# Patient Record
Sex: MALE | Race: AMERICAN INDIAN OR ALASKA NATIVE
[De-identification: names, ages, dates, MRNs, and addresses within clinical notes are randomized per-mention and may not be internally consistent; named-entity substitution may affect disease eponyms.]

---

## 2018-08-11 ENCOUNTER — HOSPITAL ENCOUNTER (EMERGENCY)
Dept: HOSPITAL 5 - ED | Age: 33
Discharge: HOME | End: 2018-08-11
Payer: SELF-PAY

## 2018-08-11 VITALS — DIASTOLIC BLOOD PRESSURE: 95 MMHG | SYSTOLIC BLOOD PRESSURE: 147 MMHG
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
## 2018-08-11 DIAGNOSIS — S91.332A: Primary | ICD-10-CM
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
## 2018-08-11 PROCEDURE — 99282 EMERGENCY DEPT VISIT SF MDM: CPT

## 2018-08-11 PROCEDURE — 90715 TDAP VACCINE 7 YRS/> IM: CPT

## 2018-08-11 PROCEDURE — 90471 IMMUNIZATION ADMIN: CPT

## 2018-08-11 NOTE — EMERGENCY DEPARTMENT REPORT
- General


Chief Complaint: Puncture Wound


Stated Complaint: STEPPED ON NAIL/RT FOOT


Time Seen by Provider: 18 19:47


Source: patient


Mode of arrival: Ambulatory


Limitations: No Limitations





- History of Present Illness


Initial Comments: 


Patient 33-year-old -American male presents with status post stepping on 

a sue nail yesterday patient was wearing sneakers at time of the event 

presents for pain with mild swelling there is no erythema no drainage patient 

is ambulatory pain rated at 3/10





Onset/Timin


-: days(s)


Extremity Location: Right: Foot


Place: home


Patient Tetanus UTD: No


Context: accidental, other (stepped sue nail )


Associated Symptoms: other (pain with ambulation )





- Related Data


 Previous Rx's











 Medication  Instructions  Recorded  Last Taken  Type


 


Amoxicillin [Trimox CAP] 500 mg PO Q8H #30 capsule 14 Unknown Rx


 


HYDROcodone/APAP  [Norco 1 each PO Q4-6H PRN #20 tablet 14 Unknown 

Rx





 mg TAB]    


 


HYDROcodone/APAP 5-325 [Norman 1 each PO Q6HR PRN #20 tablet 01/20/15 Unknown Rx





5-325 mg TAB]    


 


Ibuprofen [Motrin] 600 mg PO Q8H PRN #50 tablet 01/20/15 Unknown Rx


 


Penicillin Vk [Veetids TAB] 500 mg PO QID #40 tablet 01/20/15 Unknown Rx


 


Ciprofloxacin HCl [Cipro] 500 mg PO BID #20 tablet 18 Unknown Rx


 


traMADol [Ultram] 50 mg PO Q6HR PRN #12 tablet 18 Unknown Rx











 Allergies











Allergy/AdvReac Type Severity Reaction Status Date / Time


 


No Known Allergies Allergy   Verified 18 16:32














ED Review of Systems


ROS: 


Stated complaint: STEPPED ON NAIL/RT FOOT


Other details as noted in HPI





Constitutional: denies: chills, fever


Eyes: denies: eye pain, eye discharge, vision change


ENT: denies: ear pain, throat pain


Respiratory: denies: cough, shortness of breath, wheezing


Cardiovascular: denies: chest pain, palpitations


Endocrine: no symptoms reported


Gastrointestinal: denies: abdominal pain, nausea, diarrhea


Genitourinary: denies: urgency, dysuria


Musculoskeletal: other (puncture wound right foot ).  denies: back pain, joint 

swelling, arthralgia


Skin: other (as above ).  denies: rash, lesions


Neurological: denies: headache, weakness, paresthesias


Psychiatric: denies: anxiety, depression


Hematological/Lymphatic: denies: easy bleeding, easy bruising





ED Past Medical Hx





- Past Medical History


Previous Medical History?: No





- Surgical History


Past Surgical History?: No





- Social History


Smoking Status: Never Smoker


Substance Use Type: None





- Medications


Home Medications: 


 Home Medications











 Medication  Instructions  Recorded  Confirmed  Last Taken  Type


 


Amoxicillin [Trimox CAP] 500 mg PO Q8H #30 capsule 14  Unknown Rx


 


HYDROcodone/APAP  [Norco 1 each PO Q4-6H PRN #20 tablet 14  Unknown 

Rx





 mg TAB]     


 


HYDROcodone/APAP 5-325 [Norman 1 each PO Q6HR PRN #20 tablet 01/20/15  Unknown Rx





5-325 mg TAB]     


 


Ibuprofen [Motrin] 600 mg PO Q8H PRN #50 tablet 01/20/15  Unknown Rx


 


Penicillin Vk [Veetids TAB] 500 mg PO QID #40 tablet 01/20/15  Unknown Rx


 


Ciprofloxacin HCl [Cipro] 500 mg PO BID #20 tablet 18  Unknown Rx


 


traMADol [Ultram] 50 mg PO Q6HR PRN #12 tablet 18  Unknown Rx














ED Physical Exam





- General


Limitations: No Limitations


General appearance: alert, in no apparent distress





- Head


Head exam: Present: atraumatic, normocephalic





- Eye


Eye exam: Present: normal appearance





- ENT


ENT exam: Present: mucous membranes moist





- Neck


Neck exam: Present: normal inspection





- Respiratory


Respiratory exam: Present: normal lung sounds bilaterally.  Absent: respiratory 

distress





- Cardiovascular


Cardiovascular Exam: Present: regular rate, normal rhythm.  Absent: systolic 

murmur, diastolic murmur, rubs, gallop





- GI/Abdominal


GI/Abdominal exam: Present: soft, normal bowel sounds





- Rectal


Rectal exam: Present: deferred





- Extremities Exam


Extremities exam: Present: full ROM, tenderness (right plantar foot puncture 

woudn), normal capillary refill.  Absent: pedal edema, calf tenderness





- Expanded Lower Extremity Exam


  ** Right


Foot/Toe exam: Present: tenderness, erythema, puncture wound (ppepb+2 crt <3 no 

drainage mild erythema no fever mild swelling pt is weight bearing ).  Absent: 

ecchymosis, deformity, crepidus, dislocation, amputation, foreign body, 

calcaneal tenderness, tenderness at base of 5th metatarsal, nail avulsion, 

subungual hematoma


Neuro vascular tendon exam: Present: no vascular compromise.  Absent: pulse 

deficit, abnormal cap refill, motor deficit, sensory deficit, tendon deficit, 

extremity cold to touch, pallor, abnormal 2-point discrimination, decreased fine

/light touch, foot drop, peroneal nerve deficit, significant pain with passive 

ROM of distal joint


Gait: Positive: observed and limited by pain





- Back Exam


Back exam: Present: normal inspection, full ROM.  Absent: tenderness





- Neurological Exam


Neurological exam: Present: alert, oriented X3, CN II-XII intact, normal gait, 

reflexes normal.  Absent: motor sensory deficit





- Psychiatric


Psychiatric exam: Present: normal affect, normal mood





- Skin


Skin exam: Present: warm, dry, normal color, other (puncture wound as above 

less than 1 cm mild erythema no fever no drainage ).  Absent: rash





ED Course





 Vital Signs











  18





  16:32


 


Temperature 98.6 F


 


Pulse Rate 94 H


 


Respiratory 16





Rate 


 


Blood Pressure 147/95


 


O2 Sat by Pulse 98





Oximetry 














ED Medical Decision Making





- Medical Decision Making


Right foot puncture wound less than 1 cm wound.  Betadine solution was given 

tetanus patient refuses x-rays as he is ambulatory and weightbearing .  Mild 

erythema no drainage.  Pulses easily palpable bilateral +2 patient was wearing 

sneakers at time of incident prescribed  Cipro prophylaxis for pseudomonas 

Ultram when necessary pain patient refuses Ace wrap patient wound care 

instructions including warm soaks and follow PCP in 2-3 days and verbalize 

understanding and agreement with plan will be DC'd home in stable condition at 

this time patient remains ambulatory at this time


  


Critical care attestation.: 


If time is entered above; I have spent that time in minutes in the direct care 

of this critically ill patient, excluding procedure time.








ED Disposition


Clinical Impression: 


 Puncture wound





Disposition: DC-01 TO HOME OR SELFCARE


Is pt being admited?: No


Does the pt Need Aspirin: No


Condition: Good


Instructions:  Puncture Wound (ED)


Prescriptions: 


Ciprofloxacin HCl [Cipro] 500 mg PO BID #20 tablet


traMADol [Ultram] 50 mg PO Q6HR PRN #12 tablet


 PRN Reason: Pain


Referrals: 


[facility] [Outside] - 3-5 Days


Forms:  Work/School Release Form(ED)


Time of Disposition: 20:14

## 2019-03-02 ENCOUNTER — HOSPITAL ENCOUNTER (EMERGENCY)
Dept: HOSPITAL 5 - ED | Age: 34
LOS: 1 days | Discharge: HOME | End: 2019-03-03
Payer: COMMERCIAL

## 2019-03-02 DIAGNOSIS — N39.0: Primary | ICD-10-CM

## 2019-03-02 DIAGNOSIS — F17.200: ICD-10-CM

## 2019-03-02 DIAGNOSIS — F14.10: ICD-10-CM

## 2019-03-02 LAB
BILIRUB UR QL STRIP: (no result)
BLOOD UR QL VISUAL: (no result)
MUCOUS THREADS #/AREA URNS HPF: (no result) /HPF
PH UR STRIP: 5 [PH] (ref 5–7)
PROT UR STRIP-MCNC: (no result) MG/DL
RBC #/AREA URNS HPF: 60 /HPF (ref 0–6)
UROBILINOGEN UR-MCNC: 2 MG/DL (ref ?–2)
WBC #/AREA URNS HPF: 11 /HPF (ref 0–6)

## 2019-03-02 PROCEDURE — 81001 URINALYSIS AUTO W/SCOPE: CPT

## 2019-03-02 NOTE — EMERGENCY DEPARTMENT REPORT
ED Male  HPI





- General


Chief complaint: Urogenital-Male


Stated complaint: BLOOD IN URINE


Time Seen by Provider: 19 23:39


Source: patient


Mode of arrival: Ambulatory


Limitations: No Limitations





- History of Present Illness


Initial comments: 


Physical -American male who presents for hematuria times one episode 

today patient Arely denies fevers chills or nausea vomiting there is no back pain

and urinary hesitancy no prostate problem patient refuses CT to rule out kidney 

stone she's having no pain





MD Complaint: dysuria


Onset/Timin


-: days(s)


Radiation: none, eyes


Severity scale (0 -10): 1


Quality: other (hematuria )


Consistency: intermittent


Improves with: none


Worsens with: none


denies other symptoms





- Related Data


Sexually active: Yes


                                  Previous Rx's











 Medication  Instructions  Recorded  Last Taken  Type


 


Amoxicillin [Trimox CAP] 500 mg PO Q8H #30 capsule 14 Unknown Rx


 


HYDROcodone/APAP  [Norco 1 each PO Q4-6H PRN #20 tablet 14 Unknown 

Rx





 mg TAB]    


 


HYDROcodone/APAP 5-325 [Victor 1 each PO Q6HR PRN #20 tablet 01/20/15 Unknown Rx





5-325 mg TAB]    


 


Ibuprofen [Motrin] 600 mg PO Q8H PRN #50 tablet 01/20/15 Unknown Rx


 


Penicillin Vk [Veetids TAB] 500 mg PO QID #40 tablet 01/20/15 Unknown Rx


 


Ciprofloxacin HCl [Cipro] 500 mg PO BID #20 tablet 18 Unknown Rx


 


traMADol [Ultram] 50 mg PO Q6HR PRN #12 tablet 18 Unknown Rx


 


Ciprofloxacin HCl [Cipro] 500 mg PO BID 10 Days #20 tablet 19 Unknown Rx











                                    Allergies











Allergy/AdvReac Type Severity Reaction Status Date / Time


 


No Known Allergies Allergy   Verified 18 16:32














ED Review of Systems


ROS: 


Stated complaint: BLOOD IN URINE


Other details as noted in HPI





Constitutional: denies: chills, fever


Eyes: denies: eye pain, eye discharge, vision change


ENT: denies: ear pain, throat pain


Respiratory: denies: cough, shortness of breath, wheezing


Cardiovascular: denies: chest pain, palpitations


Endocrine: no symptoms reported


Gastrointestinal: denies: abdominal pain, nausea, diarrhea


Genitourinary: hematuria.  denies: urgency, dysuria, frequency, discharge, 

testicular pain, testicular mass


Musculoskeletal: denies: back pain, joint swelling, arthralgia


Skin: denies: rash, lesions


Neurological: denies: headache, weakness, paresthesias


Psychiatric: denies: anxiety, depression


Hematological/Lymphatic: denies: easy bleeding, easy bruising





ED Past Medical Hx





- Past Medical History


Previous Medical History?: No





- Surgical History


Past Surgical History?: No





- Social History


Smoking Status: Current Every Day Smoker


Substance Use Type: Alcohol, Cocaine





- Medications


Home Medications: 


                                Home Medications











 Medication  Instructions  Recorded  Confirmed  Last Taken  Type


 


Amoxicillin [Trimox CAP] 500 mg PO Q8H #30 capsule 14  Unknown Rx


 


HYDROcodone/APAP  [Norco 1 each PO Q4-6H PRN #20 tablet 14  Unknown 

Rx





 mg TAB]     


 


HYDROcodone/APAP 5-325 [Victor 1 each PO Q6HR PRN #20 tablet 01/20/15  Unknown Rx





5-325 mg TAB]     


 


Ibuprofen [Motrin] 600 mg PO Q8H PRN #50 tablet 01/20/15  Unknown Rx


 


Penicillin Vk [Veetids TAB] 500 mg PO QID #40 tablet 01/20/15  Unknown Rx


 


Ciprofloxacin HCl [Cipro] 500 mg PO BID #20 tablet 18  Unknown Rx


 


traMADol [Ultram] 50 mg PO Q6HR PRN #12 tablet 18  Unknown Rx


 


Ciprofloxacin HCl [Cipro] 500 mg PO BID 10 Days #20 tablet 19  Unknown Rx














ED Physical Exam





- General


Limitations: No Limitations


General appearance: alert, in no apparent distress





- Head


Head exam: Present: atraumatic, normocephalic





- Eye


Eye exam: Present: normal appearance, PERRL, EOMI


Pupils: Present: normal accommodation





- ENT


ENT exam: Present: mucous membranes moist





- Neck


Neck exam: Present: normal inspection, full ROM.  Absent: lymphadenopathy





- Respiratory


Respiratory exam: Present: normal lung sounds bilaterally.  Absent: respiratory 

distress, stridor, chest wall tenderness





- Cardiovascular


Cardiovascular Exam: Present: regular rate, normal rhythm, normal heart sounds. 

 Absent: systolic murmur, diastolic murmur, rubs, gallop





- GI/Abdominal


GI/Abdominal exam: Present: soft, normal bowel sounds.  Absent: distended, 

rebound, bruit, hernia





- Rectal


Rectal exam: Present: deferred





- 


 exam: Present: other (deferred per patient )





- Extremities Exam


Extremities exam: Present: normal inspection, full ROM.  Absent: tenderness





- Back Exam


Back exam: Present: normal inspection, full ROM.  Absent: tenderness, CVA 

tenderness (R), CVA tenderness (L), muscle spasm, rash noted





- Neurological Exam


Neurological exam: Present: alert, oriented X3





- Psychiatric


Psychiatric exam: Present: normal affect, normal mood





- Skin


Skin exam: Present: warm, dry, intact, normal color.  Absent: rash





ED Course


                                   Vital Signs











  19





  19:53 20:20


 


Temperature 98.0 F 98 F


 


Pulse Rate 85 87


 


Respiratory 18 18





Rate  


 


Blood Pressure 146/94 146/94


 


O2 Sat by Pulse 98 98





Oximetry  














ED Medical Decision Making





- Lab Data








Labs











  19





  21:33


 


Urine Color  Yellow


 


Urine Turbidity  Clear


 


Urine pH  5.0


 


Ur Specific Gravity  1.023


 


Urine Protein  <15 mg/dl


 


Urine Glucose (UA)  Neg


 


Urine Ketones  Neg


 


Urine Blood  Lg


 


Urine Nitrite  Neg


 


Urine Bilirubin  Neg


 


Urine Urobilinogen  2.0


 


Ur Leukocyte Esterase  Sm


 


Urine WBC (Auto)  11.0 H


 


Urine RBC (Auto)  60.0


 


U Epithel Cells (Auto)  2.0


 


Urine Mucus  Few














- Medical Decision Making


pt states hematuria x 1 today, denies truama, no pain , denies back pain no dy

suria frequency or urgency no flow problem denies fever no chills no prostate 

problem no hx of renal stones, pt refuses ct stone protocol, plan: cipro x 10 

days follow up with urology in 2-3 days  , follow up with pcp in 2-3 days  given

 referral to both pt will return to emergency if symptoms worsen or unable to 

void, pt verbalized agreement and understanding of discharge plan. pt for dc to 

home in stable condition at this time





Critical care attestation.: 


If time is entered above; I have spent that time in minutes in the direct care 

of this critically ill patient, excluding procedure time.








ED Disposition


Clinical Impression: 


Hematuria


Qualifiers:


 Hematuria type: unspecified type Qualified Code(s): R31.9 - Hematuria, 

unspecified





UTI (urinary tract infection)


Qualifiers:


 Urinary tract infection type: acute cystitis Hematuria presence: with hematuria

 Qualified Code(s): N30.01 - Acute cystitis with hematuria





Disposition: DC-01 TO HOME OR SELFCARE


Is pt being admited?: No


Does the pt Need Aspirin: No


Condition: Stable


Instructions:  Urinary Tract Infection in Men (ED), Acute Hematuria (ED)


Prescriptions: 


Ciprofloxacin HCl [Cipro] 500 mg PO BID 10 Days #20 tablet


Referrals: 


ARNEL SZYMANSKI MD [Staff Physician] - 3-5 Days


[facility] access hospital [Outside] - 3-5 Days


Forms:  Work/School Release Form(ED)


Time of Disposition: 23:59

## 2019-03-03 VITALS — DIASTOLIC BLOOD PRESSURE: 74 MMHG | SYSTOLIC BLOOD PRESSURE: 134 MMHG
